# Patient Record
Sex: MALE | Employment: FULL TIME | URBAN - METROPOLITAN AREA
[De-identification: names, ages, dates, MRNs, and addresses within clinical notes are randomized per-mention and may not be internally consistent; named-entity substitution may affect disease eponyms.]

---

## 2012-05-01 LAB
ALBUMIN SERPL-MCNC: NORMAL G/DL
ALP SERPL-CCNC: 86 U/L
ALT SERPL-CCNC: 87 U/L
ANION GAP SERPL CALCULATED.3IONS-SCNC: NORMAL MMOL/L
AST SERPL-CCNC: 46 U/L
BILIRUB SERPL-MCNC: 0.8 MG/DL
BILIRUBIN DIRECT: NORMAL MG/DL
BUN SERPL-MCNC: NORMAL MG/DL
CALCIUM SERPL-MCNC: NORMAL MG/DL
CHLORIDE SERPLBLD-SCNC: 105 MMOL/L
CHOLEST SERPL-MCNC: 191 MG/DL
CO2 SERPL-SCNC: NORMAL MMOL/L
CREAT SERPL-MCNC: 1.1 MG/DL
GFR SERPL CREATININE-BSD FRML MDRD: NORMAL ML/MIN/1.73M2
GLUCOSE SERPL-MCNC: 93 MG/DL (ref 70–99)
GLUCOSE SERPL-MCNC: NORMAL MG/DL (ref 70–99)
HDLC SERPL-MCNC: 43 MG/DL
LDLC SERPL CALC-MCNC: 112 MG/DL
NONHDLC SERPL-MCNC: NORMAL MG/DL
POTASSIUM SERPL-SCNC: 5.2 MMOL/L
PROT SERPL-MCNC: NORMAL G/DL
SODIUM SERPL-SCNC: 139 MMOL/L
TRIGL SERPL-MCNC: 179 MG/DL

## 2017-01-09 ENCOUNTER — TRANSFERRED RECORDS (OUTPATIENT)
Dept: CARDIOLOGY | Facility: CLINIC | Age: 47
End: 2017-01-09

## 2017-05-17 ENCOUNTER — TELEPHONE (OUTPATIENT)
Dept: CARDIOLOGY | Facility: CLINIC | Age: 47
End: 2017-05-17

## 2017-05-17 NOTE — TELEPHONE ENCOUNTER
Attempted to call pt's wife re: any medical history, left message for wife to call back. LPenfield RN

## 2017-05-18 ENCOUNTER — DOCUMENTATION ONLY (OUTPATIENT)
Dept: CARDIOLOGY | Facility: CLINIC | Age: 47
End: 2017-05-18

## 2017-05-19 ENCOUNTER — OFFICE VISIT (OUTPATIENT)
Dept: CARDIOLOGY | Facility: CLINIC | Age: 47
End: 2017-05-19
Payer: COMMERCIAL

## 2017-05-19 VITALS
WEIGHT: 278 LBS | HEIGHT: 74 IN | SYSTOLIC BLOOD PRESSURE: 140 MMHG | HEART RATE: 75 BPM | BODY MASS INDEX: 35.68 KG/M2 | DIASTOLIC BLOOD PRESSURE: 80 MMHG

## 2017-05-19 DIAGNOSIS — R07.89 OTHER CHEST PAIN: ICD-10-CM

## 2017-05-19 DIAGNOSIS — Z82.49 FH: CORONARY ARTERY DISEASE: Primary | ICD-10-CM

## 2017-05-19 PROCEDURE — 93000 ELECTROCARDIOGRAM COMPLETE: CPT | Performed by: INTERNAL MEDICINE

## 2017-05-19 PROCEDURE — 99204 OFFICE O/P NEW MOD 45 MIN: CPT | Mod: 25 | Performed by: INTERNAL MEDICINE

## 2017-05-19 NOTE — LETTER
5/19/2017    [facility] [place]  [address]  [place] [address]    RE: Jt Kaba       Dear Colleague,    REFERRING PHYSICIAN:  [facility].      INDICATION FOR CARDIAC CONSULTATION:  Chest discomfort.  Strong family history of coronary artery disease.      It is my pleasure to see your patient, Jt Kaba, who is a very pleasant 46-year-old gentleman who has a very strong family history of coronary artery disease.  His grandmother and mother both had coronary artery disease with bypass surgery.  His brother, who is a mixed martial arts specialist, had a heart attack in his mid 40s.  He never smoked.  His diet was not particularly good, however.  The patient has been complaining of some chest discomfort.  He has 1 type of chest discomfort which is a feeling of a pressure in the chest.  He says it feels somewhat like when one is underwater trying to get ones breath.  It is not aggravated by exertion.  It is not relieved by rest.  He has a second type of discomfort which is described as a sharp pain going from the lower sternal straight through into the back.  He has noticed that more frequently.  He has not had his cholesterol checked in the past.  His blood pressure is borderline here at 140/80.  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  Now he weighs 278 pounds.      His 12-lead EKG was essentially normal.  He has an RSR prime pattern in V1 and V2, which is a normal variant.  So he has no EKG changes while he is having the discomfort today.  He has no ankle edema.  He has no orthopnea, no PND.     Outpatient Encounter Prescriptions as of 5/19/2017   Medication Sig Dispense Refill     TRAMADOL HCL PO Take 50 mg by mouth every 6 hours as needed for moderate to severe pain       Zolpidem Tartrate (AMBIEN PO) Take 10 mg by mouth as needed for sleep       ASPIRIN EC PO Take 325 mg by mouth  daily       No facility-administered encounter medications on file as of 5/19/2017.       IMPRESSION:   1.  Chest discomfort.  The chest discomfort sounds noncardiac in that it is nonexertional and does not radiate to the arms or to the jaw.   2.  Strong family history.  The family history of early atherosclerosis, especially in his brother who was a very fit man, is somewhat worrisome.  His brother eventually had a stent placed for the heart attack that occurred.  It seems to be that the coronary artery disease is on his mother's side.  By all accounts, his father is still in good condition.  His grandfather on his father's side did have a stroke.   3.  Borderline hypertension.      PLAN:   1.  Firstly, we will obtain an echocardiogram as the patient is having chest discomfort to determine if there are any structural abnormalities present.   2.  Secondly, we will obtain a stress echocardiogram to investigate his chest discomfort.   3.  Thirdly, as the patient has a very strong family history of early atherosclerosis, we will obtain a calcium score to see if any calcification is present.      It has been my pleasure to be involved in the care of this very nice patient.  I will see him back early.     Sincerely,    Raymon Mendoza MD    [facility]

## 2017-05-19 NOTE — PROGRESS NOTES
HPI and Plan:   See dictation    Orders Placed This Encounter   Procedures     CT Coronary Calcium Scan     Basic metabolic panel     Lipid Profile     Follow-Up with Cardiologist     EKG 12-lead complete w/read - Clinics (performed today)     Echocardiogram     Exercise Stress Echocardiogram       Orders Placed This Encounter   Medications     TRAMADOL HCL PO     Sig: Take 50 mg by mouth every 6 hours as needed for moderate to severe pain     Zolpidem Tartrate (AMBIEN PO)     Sig: Take 10 mg by mouth as needed for sleep     ASPIRIN EC PO     Sig: Take 325 mg by mouth daily       There are no discontinued medications.      Encounter Diagnoses   Name Primary?     FH: coronary artery disease Yes     Other chest pain        CURRENT MEDICATIONS:  Current Outpatient Prescriptions   Medication Sig Dispense Refill     TRAMADOL HCL PO Take 50 mg by mouth every 6 hours as needed for moderate to severe pain       Zolpidem Tartrate (AMBIEN PO) Take 10 mg by mouth as needed for sleep       ASPIRIN EC PO Take 325 mg by mouth daily         ALLERGIES   No Known Allergies    PAST MEDICAL HISTORY:  History reviewed. No pertinent past medical history.    PAST SURGICAL HISTORY:  History reviewed. No pertinent surgical history.    FAMILY HISTORY:  Family History   Problem Relation Age of Onset     Coronary Artery Disease Mother      CANCER Mother      throat or stomach     Coronary Artery Disease Brother 45     MI, stents       SOCIAL HISTORY:  Social History     Social History     Marital status:      Spouse name: N/A     Number of children: N/A     Years of education: N/A     Social History Main Topics     Smoking status: Never Smoker     Smokeless tobacco: None     Alcohol use Yes      Comment: 8-10 beers per week     Drug use: None     Sexual activity: Not Asked     Other Topics Concern     Parent/Sibling W/ Cabg, Mi Or Angioplasty Before 65f 55m? Yes     Social History Narrative     None       Review of Systems:  Skin:   "Negative       Eyes:  Negative      ENT:  Negative      Respiratory:  Positive for shortness of breath     Cardiovascular:    Positive for;chest pain;lightheadedness (past 3 weeks)    Gastroenterology: Negative      Genitourinary:  not assessed      Musculoskeletal:  Negative      Neurologic:  Negative      Psychiatric:  Negative      Heme/Lymph/Imm:  Negative      Endocrine:  Negative        Physical Exam:  Vitals: /80  Pulse 75  Ht 1.88 m (6' 2\")  Wt 126.1 kg (278 lb)  BMI 35.69 kg/m2    Constitutional:  cooperative, alert and oriented, well developed, well nourished, in no acute distress overweight      Skin:  warm and dry to the touch, no apparent skin lesions or masses noted        Head:  normocephalic, no masses or lesions        Eyes:  pupils equal and round, conjunctivae and lids unremarkable, sclera white, no xanthalasma, EOMS intact, no nystagmus        ENT:  no pallor or cyanosis, dentition good        Neck:  carotid pulses are full and equal bilaterally, JVP normal, no carotid bruit, no thyromegaly        Chest:  normal breath sounds, clear to auscultation, normal A-P diameter, normal symmetry, normal respiratory excursion, no use of accessory muscles          Cardiac: regular rhythm, normal S1/S2, no S3 or S4, apical impulse not displaced, no murmurs, gallops or rubs                  Abdomen:  abdomen soft, non-tender, BS normoactive, no mass, no HSM, no bruits        Vascular: pulses full and equal, no bruits auscultated                                        Extremities and Back:  no deformities, clubbing, cyanosis, erythema observed;no edema              Neurological:  affect appropriate, oriented to time, person and place              CC  No referring provider defined for this encounter.              "

## 2017-05-19 NOTE — PROGRESS NOTES
REFERRING PHYSICIAN:  [facility].      INDICATION FOR CARDIAC CONSULTATION:  Chest discomfort.  Strong family history of coronary artery disease.      HISTORY OF PRESENT ILLNESS:  It is my pleasure to see your patient, Jt Kaba, who is a very pleasant 46-year-old gentleman who has a very strong family history of coronary artery disease.  His grandmother and mother both had coronary artery disease with bypass surgery.  His brother, who is a mixed martial arts specialist, had a heart attack in his mid 40s.  He never smoked.  His diet was not particularly good, however.  The patient has been complaining of some chest discomfort.  He has 1 type of chest discomfort which is a feeling of a pressure in the chest.  He says it feels somewhat like when one is underwater trying to get ones breath.  It is not aggravated by exertion.  It is not relieved by rest.  He has a second type of discomfort which is described as a sharp pain going from the lower sternal straight through into the back.  He has noticed that more frequently.  He has not had his cholesterol checked in the past.  His blood pressure is borderline here at 140/80.  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  Now he weighs 278 pounds.      His 12-lead EKG was essentially normal.  He has an RSR prime pattern in V1 and V2, which is a normal variant.  So he has no EKG changes while he is having the discomfort today.  He has no ankle edema.  He has no orthopnea, no PND.      IMPRESSION:   1.  Chest discomfort.  The chest discomfort sounds noncardiac in that it is nonexertional and does not radiate to the arms or to the jaw.   2.  Strong family history.  The family history of early atherosclerosis, especially in his brother who was a very fit man, is somewhat worrisome.  His brother eventually had a stent placed for the heart attack that occurred.  It seems to be that  the coronary artery disease is on his mother's side.  By all accounts, his father is still in good condition.  His grandfather on his father's side did have a stroke.   3.  Borderline hypertension.      PLAN:   1.  Firstly, we will obtain an echocardiogram as the patient is having chest discomfort to determine if there are any structural abnormalities present.   2.  Secondly, we will obtain a stress echocardiogram to investigate his chest discomfort.   3.  Thirdly, as the patient has a very strong family history of early atherosclerosis, we will obtain a calcium score to see if any calcification is present.      It has been my pleasure to be involved in the care of this very nice patient.  I will see him back early.         ROBSON SANTORO MD, [facility]             D: 2017 09:14   T: 2017 18:05   MT: SCARLET      Name:     LEI JACKSON   MRN:      [id number]        Account:      [id number]   :      1970           Service Date: 2017      Document: [id number]

## 2017-05-19 NOTE — MR AVS SNAPSHOT
After Visit Summary   5/19/2017    Jt Kaba    MRN: [id number]           Patient Information     Date Of Birth          1970        Visit Information        Provider Department      5/19/2017 8:30 AM Raymon Mendoza MD [facility]        Today's Diagnoses     FH: coronary artery disease    -  1    Other chest pain           Follow-ups after your visit        Additional Services     Follow-Up with Cardiologist                 Your next 10 appointments already scheduled     May 31, 2017  9:45 AM CDT   Return Visit with Raymon Mendoza MD   [facility] ([facility])    [address] [place] [id number]   [phone]              Future tests that were ordered for you today     Open Future Orders        Priority Expected Expires Ordered    Follow-Up with Cardiologist Routine 5/26/2017 5/19/2018 5/19/2017    Basic metabolic panel Routine 5/19/2017 5/14/2018 5/19/2017    Lipid Profile Routine 5/19/2017 5/19/2018 5/19/2017    CT Coronary Calcium Scan Routine 5/20/2017 5/19/2018 5/19/2017    Echocardiogram Routine 5/26/2017 5/19/2018 5/19/2017    Exercise Stress Echocardiogram Routine 5/26/2017 5/19/2018 5/19/2017            Who to contact     If you have questions or need follow up information about today's clinic visit or your schedule please contact [facility] directly at [phone].  Normal or non-critical lab and imaging results will be communicated to you by MyChart, letter or phone within 4 business days after the clinic has received the results. If you do not hear from us within 7 days, please contact the clinic through MyChart or phone. If you have a critical or abnormal lab result, we will notify you by phone as soon as possible.  Submit refill requests through Meta or call your pharmacy and they will forward  "the refill request to us. Please allow 3 business days for your refill to be completed.          Additional Information About Your Visit        [employer] Information     [facility] lets you send messages to your doctor, view your test results, renew your prescriptions, schedule appointments and more. To sign up, go to [email or web address] . Click on \"Log in\" on the left side of the screen, which will take you to the Welcome page. Then click on \"Sign up Now\" on the right side of the page.     You will be asked to enter the access code listed below, as well as some personal information. Please follow the directions to create your username and password.     Your access code is: [email or web address]  Expires: 2017  9:15 AM     Your access code will  in 90 days. If you need help or a new code, please call your [facility] clinic or [phone].        Care EveryWhere ID     This is your Care EveryWhere ID. This could be used by other organizations to access your [facility] medical records  [id number]        Your Vitals Were     Pulse Height BMI (Body Mass Index)             75 1.88 m (6' 2\") 35.69 kg/m2          Blood Pressure from Last 3 Encounters:   17 140/80    Weight from Last 3 Encounters:   17 126.1 kg (278 lb)              We Performed the Following     EKG 12-lead complete w/read - Clinics (performed today)        Primary Care Provider Office Phone # Fax #    [facility] [phone]       [address] [place] [address] [address]  [facility] [address]        Thank you!     Thank you for choosing [facility]  for your care. Our goal is always to provide you with excellent care. Hearing back from our patients is one way we can continue to improve our services. Please take a few minutes to complete the written survey that you may receive in the mail after your visit with us. Thank you!             Your Updated Medication List - Protect others around " you: Learn how to safely use, store and throw away your medicines at www.disposemymeds.org.          This list is accurate as of: 5/19/17  9:15 AM.  Always use your most recent med list.                   Brand Name Dispense Instructions for use    AMBIEN PO      Take 10 mg by mouth as needed for sleep       ASPIRIN EC PO      Take 325 mg by mouth daily       TRAMADOL HCL PO      Take 50 mg by mouth every 6 hours as needed for moderate to severe pain

## 2017-06-02 ENCOUNTER — HOSPITAL ENCOUNTER (OUTPATIENT)
Dept: CARDIOLOGY | Facility: CLINIC | Age: 47
End: 2017-06-02
Attending: INTERNAL MEDICINE
Payer: COMMERCIAL

## 2017-06-02 ENCOUNTER — HOSPITAL ENCOUNTER (OUTPATIENT)
Dept: CARDIOLOGY | Facility: CLINIC | Age: 47
Discharge: HOME | End: 2017-06-02
Attending: INTERNAL MEDICINE | Admitting: INTERNAL MEDICINE
Payer: COMMERCIAL

## 2017-06-02 DIAGNOSIS — Z82.49 FH: CORONARY ARTERY DISEASE: ICD-10-CM

## 2017-06-02 DIAGNOSIS — R07.89 OTHER CHEST PAIN: ICD-10-CM

## 2017-06-02 LAB
ANION GAP SERPL CALCULATED.3IONS-SCNC: 11.3 MMOL/L (ref 6–17)
BUN SERPL-MCNC: 11 MG/DL (ref 7–30)
CALCIUM SERPL-MCNC: 8.2 MG/DL (ref 8.5–10.5)
CHLORIDE SERPL-SCNC: 104 MMOL/L (ref 98–107)
CHOLEST SERPL-MCNC: 159 MG/DL
CO2 SERPL-SCNC: 26 MMOL/L (ref 23–29)
CREAT SERPL-MCNC: 1.23 MG/DL (ref 0.7–1.3)
GFR SERPL CREATININE-BSD FRML MDRD: 63 ML/MIN/1.7M2
GLUCOSE SERPL-MCNC: 111 MG/DL (ref 70–105)
HDLC SERPL-MCNC: 37 MG/DL
LDLC SERPL CALC-MCNC: 91 MG/DL
NONHDLC SERPL-MCNC: 122 MG/DL
POTASSIUM SERPL-SCNC: 4.3 MMOL/L (ref 3.5–5.1)
SODIUM SERPL-SCNC: 137 MMOL/L (ref 136–145)
TRIGL SERPL-MCNC: 155 MG/DL

## 2017-06-02 PROCEDURE — 93350 STRESS TTE ONLY: CPT | Mod: 26 | Performed by: INTERNAL MEDICINE

## 2017-06-02 PROCEDURE — 93306 TTE W/DOPPLER COMPLETE: CPT

## 2017-06-02 PROCEDURE — 93016 CV STRESS TEST SUPVJ ONLY: CPT | Performed by: INTERNAL MEDICINE

## 2017-06-02 PROCEDURE — 40000264 ECHO STRESS TEST WITH LUMASON

## 2017-06-02 PROCEDURE — 93018 CV STRESS TEST I&R ONLY: CPT | Performed by: INTERNAL MEDICINE

## 2017-06-02 PROCEDURE — 93306 TTE W/DOPPLER COMPLETE: CPT | Mod: 26 | Performed by: INTERNAL MEDICINE

## 2017-06-02 PROCEDURE — 80061 LIPID PANEL: CPT | Performed by: INTERNAL MEDICINE

## 2017-06-02 PROCEDURE — 93325 DOPPLER ECHO COLOR FLOW MAPG: CPT | Mod: 26 | Performed by: INTERNAL MEDICINE

## 2017-06-02 PROCEDURE — 25500064 ZZH RX 255 OP 636: Performed by: INTERNAL MEDICINE

## 2017-06-02 PROCEDURE — 36415 COLL VENOUS BLD VENIPUNCTURE: CPT | Performed by: INTERNAL MEDICINE

## 2017-06-02 PROCEDURE — 80048 BASIC METABOLIC PNL TOTAL CA: CPT | Performed by: INTERNAL MEDICINE

## 2017-06-02 PROCEDURE — 93321 DOPPLER ECHO F-UP/LMTD STD: CPT | Mod: 26 | Performed by: INTERNAL MEDICINE

## 2017-06-02 RX ADMIN — SULFUR HEXAFLUORIDE 5 ML: KIT at 09:38

## 2017-06-13 ENCOUNTER — OFFICE VISIT (OUTPATIENT)
Dept: CARDIOLOGY | Facility: CLINIC | Age: 47
End: 2017-06-13
Payer: COMMERCIAL

## 2017-06-13 VITALS
HEIGHT: 74 IN | WEIGHT: 281 LBS | DIASTOLIC BLOOD PRESSURE: 91 MMHG | BODY MASS INDEX: 36.06 KG/M2 | HEART RATE: 81 BPM | SYSTOLIC BLOOD PRESSURE: 147 MMHG

## 2017-06-13 DIAGNOSIS — R07.89 OTHER CHEST PAIN: ICD-10-CM

## 2017-06-13 DIAGNOSIS — Z82.49 FH: CORONARY ARTERY DISEASE: ICD-10-CM

## 2017-06-13 PROCEDURE — 99214 OFFICE O/P EST MOD 30 MIN: CPT | Performed by: INTERNAL MEDICINE

## 2017-06-13 NOTE — PROGRESS NOTES
HPI and Plan:   See dictation    Orders Placed This Encounter   Procedures     Lipid Profile     ALT     Basic metabolic panel     Follow-Up with Cardiologist       No orders of the defined types were placed in this encounter.      There are no discontinued medications.      Encounter Diagnoses   Name Primary?     FH: coronary artery disease      Other chest pain        CURRENT MEDICATIONS:  Current Outpatient Prescriptions   Medication Sig Dispense Refill     TRAMADOL HCL PO Take 50 mg by mouth every 6 hours as needed for moderate to severe pain       Zolpidem Tartrate (AMBIEN PO) Take 10 mg by mouth as needed for sleep       ASPIRIN EC PO Take 325 mg by mouth daily         ALLERGIES   No Known Allergies    PAST MEDICAL HISTORY:  No past medical history on file.    PAST SURGICAL HISTORY:  No past surgical history on file.    FAMILY HISTORY:  Family History   Problem Relation Age of Onset     Coronary Artery Disease Mother      CANCER Mother      throat or stomach     Coronary Artery Disease Brother 45     MI, stents       SOCIAL HISTORY:  Social History     Social History     Marital status:      Spouse name: N/A     Number of children: N/A     Years of education: N/A     Social History Main Topics     Smoking status: Never Smoker     Smokeless tobacco: None     Alcohol use Yes      Comment: 8-10 beers per week     Drug use: None     Sexual activity: Not Asked     Other Topics Concern     Parent/Sibling W/ Cabg, Mi Or Angioplasty Before 65f 55m? Yes     Social History Narrative       Review of Systems:  Skin:  Negative       Eyes:  Negative      ENT:  Negative      Respiratory:  Negative       Cardiovascular:    Positive for;dizziness x2   Gastroenterology: Negative      Genitourinary:  not assessed      Musculoskeletal:  Negative      Neurologic:  Negative      Psychiatric:  Negative      Heme/Lymph/Imm:  Negative      Endocrine:  Negative        Physical Exam:  Vitals: BP (!) 147/91  Pulse 81  Ht 1.88 m  "(6' 2\")  Wt 127.5 kg (281 lb)  BMI 36.08 kg/m2    Constitutional:           Skin:           Head:           Eyes:           ENT:           Neck:           Chest:             Cardiac:                    Abdomen:           Vascular:                                          Extremities and Back:                 Neurological:                 CC  Raymon Mendoza MD   [facility]  [address]  [place] [address]              "

## 2017-06-13 NOTE — LETTER
2017             [facility]      RE: Jt Kaba    MRN: [id number]   : 1970      Dear Doctors:       It is my pleasure to see your patient Jt Kaba, who is a very pleasant, 46-year-old gentleman with a strong family history of coronary artery disease, which I delineated on my dictation from just under 4 weeks ago.  As you know, he has been complaining of episodes of atypical chest discomfort.  Because of the strong family history and the atypical chest discomfort, we performed a stress echocardiogram.  The stress echocardiogram was normal.  The EKG portion was normal, and echo portion was also normal, showing no evidence of ischemia.  However, he did have some discomfort in his chest when he was exerting himself.  However, the evidence is very strong that this is noncardiac.  His regular echocardiogram was also quite normal, except that there was evidence of mild concentric left ventricular hypertrophy.  His blood pressure prior to the echocardiogram was 150/90.  The last 2 blood pressure checks have also been in the borderline of normal or mildly abnormal range.  On  of this year, his blood pressure was 140/80.  Today his blood pressure is 147/91.  We did perform a lipid profile on .  His LDL was normal at 91.  His HDL was mildly reduced at 37, and his triglycerides were mildly raised at 155.  The basic metabolic profile was relatively normal except that his glucose was raised at 111, which may account for his triglycerides being mildly raised.  His kidney function was normal, and his electrolytes were entirely normal.      IMPRESSION:   1.  Normal stress echocardiogram at a good workload with no evidence of ischemia on either the EKG portion or the echo portion, but he did have some discomfort in his chest with exertion.   2.  Mild HDL deficiency and mild hypertriglyceridemia, which may in part be due to the fact the patient is overweight and somewhat sedentary, but  his fasting glucose is also raised at 111.  He probably has metabolic syndrome.   3.  Essential hypertension.  It is likely that the patient does have hypertension given the fact that he has LVH, and he has had 3 blood pressure measurements, which have been borderline to mildly raised.      PLAN:  Initially, I did suggest an angiotensin receptor blocker for blood pressure lowering, but the patient would like to try lifestyle changes, including exercise and weight reduction, which will hit many birds with one stone.  This will improve his blood pressure.  It will improve his hypertriglyceridemia and HDL deficiency.  It will also improve his glucose levels, so in the end we decided that he would adopt lifestyle changes and return to see me in 4 months' time.  If at that stage he still has a raised blood pressure, I will add in an angiotensin receptor blocker.  Finally, I did tell him if he notices that he is continuing to get predominantly exertional chest discomfort, he is to contact us immediately because I did explain to him that the stress tests are very accurate, but there is a false negative rate, too, as well, so he understands that.        It has been a pleasure to be involved in the care of this very nice patient.      Sincerely,      Raymon Pickens MD, FACC

## 2017-06-13 NOTE — MR AVS SNAPSHOT
"              After Visit Summary   6/13/2017    Jt Kaba    MRN: [id number]           Patient Information     Date Of Birth          1970        Visit Information        Provider Department      6/13/2017 2:15 PM Raymon Mendoza MD [facility]        Today's Diagnoses     FH: coronary artery disease        Other chest pain           Follow-ups after your visit        Additional Services     Follow-Up with Cardiologist                 Future tests that were ordered for you today     Open Future Orders        Priority Expected Expires Ordered    Lipid Profile Routine 10/11/2017 6/13/2018 6/13/2017    ALT Routine 10/11/2017 6/13/2018 6/13/2017    Basic metabolic panel Routine 10/11/2017 6/13/2018 6/13/2017    Follow-Up with Cardiologist Routine 10/11/2017 6/13/2018 6/13/2017            Who to contact     If you have questions or need follow up information about today's clinic visit or your schedule please contact [facility] directly at [phone].  Normal or non-critical lab and imaging results will be communicated to you by [email or web address], letter or phone within 4 business days after the clinic has received the results. If you do not hear from us within 7 days, please contact the clinic through [email or web address] or phone. If you have a critical or abnormal lab result, we will notify you by phone as soon as possible.  Submit refill requests through [employer] or call your pharmacy and they will forward the refill request to us. Please allow 3 business days for your refill to be completed.          Additional Information About Your Visit        [email or web address] Information     [employer] lets you send messages to your doctor, view your test results, renew your prescriptions, schedule appointments and more. To sign up, go to [email or web address] . Click on \"Log in\" on the left side of the screen, which will take you to the Welcome page. " "Then click on \"Sign up Now\" on the right side of the page.     You will be asked to enter the access code listed below, as well as some personal information. Please follow the directions to create your username and password.     Your access code is: [email or web address]  Expires: 2017  9:15 AM     Your access code will  in 90 days. If you need help or a new code, please call your [facility] clinic or [phone].        Care EveryWhere ID     This is your Care EveryWhere ID. This could be used by other organizations to access your [facility] medical records  [id number]        Your Vitals Were     Pulse Height BMI (Body Mass Index)             81 1.88 m (6' 2\") 36.08 kg/m2          Blood Pressure from Last 3 Encounters:   17 (!) 147/91   17 140/80    Weight from Last 3 Encounters:   17 127.5 kg (281 lb)   17 126.1 kg (278 lb)              We Performed the Following     Follow-Up with Cardiologist        Primary Care Provider Office Phone # Fax #    [facility] [phone]       [address] [place] [address]. [address]  [facility] [address]        Thank you!     Thank you for choosing [facility]  for your care. Our goal is always to provide you with excellent care. Hearing back from our patients is one way we can continue to improve our services. Please take a few minutes to complete the written survey that you may receive in the mail after your visit with us. Thank you!             Your Updated Medication List - Protect others around you: Learn how to safely use, store and throw away your medicines at www.disposemymeds.org.          This list is accurate as of: 17  2:42 PM.  Always use your most recent med list.                   Brand Name Dispense Instructions for use    AMBIEN PO      Take 10 mg by mouth as needed for sleep       ASPIRIN EC PO      Take 325 mg by mouth daily       TRAMADOL HCL PO      Take 50 mg by mouth every 6 " hours as needed for moderate to severe pain

## 2017-06-14 NOTE — PROGRESS NOTES
2017             [facility]      RE: Jt Kaba    MRN: [id number]   : 1970      Dear Doctors:       It is my pleasure to see your patient Jt Kaba, who is a very pleasant, 46-year-old gentleman with a strong family history of coronary artery disease, which I delineated on my dictation from just under 4 weeks ago.  As you know, he has been complaining of episodes of atypical chest discomfort.  Because of the strong family history and the atypical chest discomfort, we performed a stress echocardiogram.  The stress echocardiogram was normal.  The EKG portion was normal, and echo portion was also normal, showing no evidence of ischemia.  However, he did have some discomfort in his chest when he was exerting himself.  However, the evidence is very strong that this is noncardiac.  His regular echocardiogram was also quite normal, except that there was evidence of mild concentric left ventricular hypertrophy.  His blood pressure prior to the echocardiogram was 150/90.  The last 2 blood pressure checks have also been in the borderline of normal or mildly abnormal range.  On  of this year, his blood pressure was 140/80.  Today his blood pressure is 147/91.  We did perform a lipid profile on .  His LDL was normal at 91.  His HDL was mildly reduced at 37, and his triglycerides were mildly raised at 155.  The basic metabolic profile was relatively normal except that his glucose was raised at 111, which may account for his triglycerides being mildly raised.  His kidney function was normal, and his electrolytes were entirely normal.      IMPRESSION:   1.  Normal stress echocardiogram at a good workload with no evidence of ischemia on either the EKG portion or the echo portion, but he did have some discomfort in his chest with exertion.   2.  Mild HDL deficiency and mild hypertriglyceridemia, which may in part be due to the fact the patient is overweight and somewhat sedentary, but  his fasting glucose is also raised at 111.  He probably has metabolic syndrome.   3.  Essential hypertension.  It is likely that the patient does have hypertension given the fact that he has LVH, and he has had 3 blood pressure measurements, which have been borderline to mildly raised.      PLAN:  Initially, I did suggest an angiotensin receptor blocker for blood pressure lowering, but the patient would like to try lifestyle changes, including exercise and weight reduction, which will hit many birds with one stone.  This will improve his blood pressure.  It will improve his hypertriglyceridemia and HDL deficiency.  It will also improve his glucose levels, so in the end we decided that he would adopt lifestyle changes and return to see me in 4 months' time.  If at that stage he still has a raised blood pressure, I will add in an angiotensin receptor blocker.  Finally, I did tell him if he notices that he is continuing to get predominantly exertional chest discomfort, he is to contact us immediately because I did explain to him that the stress tests are very accurate, but there is a false negative rate, too, as well, so he understands that.        It has been a pleasure to be involved in the care of this very nice patient.      Sincerely,      Raymon Pickens MD, FACC         RAYMON PICKENS MD, FACC             D: 2017 14:41   T: 2017 11:29   MT: denise      Name:     LEI JACKSON   MRN:      [id number]        Account:      [id number]   :      1970           Service Date: 2017      Document: [id number]

## 2024-10-21 ENCOUNTER — TELEPHONE (OUTPATIENT)
Dept: PHYSICAL MEDICINE AND REHAB | Facility: CLINIC | Age: 54
End: 2024-10-21

## 2024-10-21 DIAGNOSIS — G54.5 PARSONAGE-TURNER SYNDROME: Primary | ICD-10-CM

## 2024-10-21 DIAGNOSIS — M25.512 CHRONIC LEFT SHOULDER PAIN: ICD-10-CM

## 2024-10-21 DIAGNOSIS — M79.2 NEUROPATHIC PAIN: ICD-10-CM

## 2024-10-21 DIAGNOSIS — G89.29 CHRONIC LEFT SHOULDER PAIN: ICD-10-CM

## 2024-10-21 DIAGNOSIS — M54.10 BRACHIAL NEURITIS OF LEFT UPPER EXTREMITY: ICD-10-CM

## 2024-10-21 NOTE — TELEPHONE ENCOUNTER
Called patient to schedule procedure with Dr. Marcos    Date of Procedure: heather 11/22/24    Arrival time given: Yes: Arrival Time 11am       Procedure Location: [facility] [place]     Verified Location with Patient:  Yes  Address provided to the patient     Pre-op H&P Required:  No: Local anesthesia        Post-Op/Follow Up Appt:  Not Indicated in Request      Informed patient they will need a  to drive them home:  Yes    Patients : Spouse     Patient is aware that pre-op RN from the procedure center will call 2-3 days prior to scheduled procedure to confirm arrival time and review any instructions:  Yes       Additional Comments: N/A        Martha Vásquez MA on 10/21/2024 at 1:07 PM      P: [phone]*

## 2024-11-22 ENCOUNTER — ANCILLARY PROCEDURE (OUTPATIENT)
Dept: ULTRASOUND IMAGING | Facility: CLINIC | Age: 54
End: 2024-11-22
Attending: PHYSICAL MEDICINE & REHABILITATION
Payer: COMMERCIAL

## 2024-11-22 ENCOUNTER — HOSPITAL ENCOUNTER (OUTPATIENT)
Facility: AMBULATORY SURGERY CENTER | Age: 54
Discharge: HOME | End: 2024-11-22
Attending: PHYSICAL MEDICINE & REHABILITATION | Admitting: PHYSICAL MEDICINE & REHABILITATION
Payer: COMMERCIAL

## 2024-11-22 VITALS
SYSTOLIC BLOOD PRESSURE: 162 MMHG | DIASTOLIC BLOOD PRESSURE: 85 MMHG | OXYGEN SATURATION: 98 % | HEART RATE: 85 BPM | RESPIRATION RATE: 18 BRPM | TEMPERATURE: 97.4 F

## 2024-11-22 DIAGNOSIS — R52 PAIN: ICD-10-CM

## 2024-11-22 PROCEDURE — G8907 PT DOC NO EVENTS ON DISCHARG: HCPCS

## 2024-11-22 PROCEDURE — G8918 PT W/O PREOP ORDER IV AB PRO: HCPCS

## 2024-11-22 PROCEDURE — 64418 NJX AA&/STRD SPRSCAP NRV: CPT | Mod: LT

## 2024-11-22 RX ORDER — BUPIVACAINE HYDROCHLORIDE 5 MG/ML
INJECTION, SOLUTION EPIDURAL; INTRACAUDAL PRN
Status: DISCONTINUED | OUTPATIENT
Start: 2024-11-22 | End: 2024-11-22 | Stop reason: HOSPADM

## 2024-11-22 RX ORDER — METFORMIN HYDROCHLORIDE 750 MG/1
750 TABLET, EXTENDED RELEASE ORAL
COMMUNITY

## 2024-11-22 RX ORDER — LIDOCAINE HYDROCHLORIDE 10 MG/ML
INJECTION, SOLUTION EPIDURAL; INFILTRATION; INTRACAUDAL; PERINEURAL PRN
Status: DISCONTINUED | OUTPATIENT
Start: 2024-11-22 | End: 2024-11-22 | Stop reason: HOSPADM

## 2024-11-22 RX ORDER — TRIAMCINOLONE ACETONIDE 40 MG/ML
INJECTION, SUSPENSION INTRA-ARTICULAR; INTRAMUSCULAR PRN
Status: DISCONTINUED | OUTPATIENT
Start: 2024-11-22 | End: 2024-11-22 | Stop reason: HOSPADM

## 2024-11-22 NOTE — DISCHARGE INSTRUCTIONS
PAIN INJECTION HOME CARE INSTRUCTIONS  Activity  -Rest today  -Do not work today  -Resume normal activity tomorrow  -DO NOT shower for 24 hours  -DO NOT remove bandaid for 24 hours    Pain  -You may experience soreness at the injection site for one or two days  -You may use an ice pack for 20 minutes every 2 hours for the first 24 hours  -You may use a heating pad after the first 24 hours  -You may use Tylenol (acetaminophen) every 4 hours or other pain medicines as directed by your physician    You may experience numbness radiating into your legs or arms (depending on the procedure location). This numbness may last several hours. Until sensation returns to normal; please use caution in walking, climbing stairs, and stepping out of your vehicle, etc.    DID YOU RECEIVE SEDATION TODAY?  No    Safety  Sedation medicine, if given, may remain active for many hours. It is important for the next 24 hours that you do not:  -Drive a car  -Operate machines or power tools  -Consume alcohol, including beer  -Sign any important papers or legal documents    DID YOU RECEIVE STEROIDS TODAY?      Common side effects of steroids:  Not everyone will experience corticosteroid side effects. If side effects are experienced, they will gradually subside in the 7-10 day period following an injection. Most common side effects include:  -Flushed face and/or chest  -Feeling of warmth, particularly in the face but could be an overall feeling of warmth  -Increased blood sugar in diabetic patients  -Menstrual irregularities my occur. If taking hormone-based birth control an alternate method of birth control is recommended  -Sleep disturbances and/or mood swings are possible  -Leg cramps    Please contact us if you have:  -Severe pain  -Fever more than 101.5 degrees Fahrenheit  -Signs of infection at the injection site (redness, swelling, or drainage)    FOR PAIN CENTER PATIENTS:  If you have questions, please contact the Pain Clinic at  [phone] Option #1 between the hours of 7:00 am and 3:00 pm Monday through Friday. After office hours you can contact the on call provider by dialing [phone]. If you need immediate attention, we recommend that you go to a hospital emergency room or dial [phone].      FOR PM&R PATIENTS:  For patients seen by the PM and R service, please call [phone]. (Monday through Friday 8a-5p.  After business hours and weekends call [phone] and ask for the PM and R doctor on call). If you need to fax a pain diary to PM&R the fax number is [phone]. If you are unable to fax, uploading to [employer] is encouraged, then send to provider. If you have procedure scheduling questions please call [phone] Option #2.

## 2024-11-22 NOTE — OP NOTE
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    INDICATIONS FOR THE PROCEDURE:  Jt Kaba is a 54 year old male who presents with chronic left shoulder pain with neuropathic features consistent with brachial neuritis/Parsonage-Francisco syndrome affecting the left upper extremity this has been confirmed with both MRI and electrodiagnostic workup.     PROCEDURE AND FINDINGS:  He was greeted in the preprocedure holding area. The risk, benefits and alternatives to the procedure were again reviewed with him and informed consent was obtained and the patient agreed to proceed. A time-out was performed. Following review alternatives, benefits and risks, the procedure was carried out under sterile prep with sterile gel. The use of direct sonographic guidance was used to ensure accurate placement of the needle (rather than non-guided injection) and required to minimize the risk of bleeding or injury to nearby neurovascular structures. Images were recorded and stored.     A 5-1MHz ultrasound transducer was used to visualize the relevant structures and determine the optimal needle path for the procedure.  1mL 1% lidocaine was infiltrated at the needle insertion site subcutaneously. Then, a 22 gauge 3.5 inch needle was advanced from lateral to medial utilizing an in-plane approach, under continuous ultrasound guidance to the perineural region of the suprascapular nerve at the spinoglenoid notch. After negative aspiration, slow injection of the treatment solution 4mL total  consisting of 1mL Kenalog (40mg/mL) and 3mL of 1% Lidocaine was instilled into affected area. The tip of the needle was visualized throughout the procedure. The remainder of the single-use vials were discarded.      He tolerated the procedure well, was discharged home in stable condition.     Before the procedure, he reported a pain score of 4/10.   After the procedure, he reported a pain score of 0/10.      Follow-up will be with referring provider (PCP - Dr. Kairna Calabrese)    COMPLICATIONS: None    COMMENTS: None

## (undated) DEVICE — LINEN TOWEL PACK X5 5464

## (undated) DEVICE — PREP CHLORAPREP W/ORANGE TINT 10.5ML 930715

## (undated) DEVICE — TRAY PAIN INJECTION 97A 640

## (undated) DEVICE — NDL CANNULA SOLOPLEX STIM 21GX100MM 001187-77

## (undated) DEVICE — GLOVE BIOGEL PI MICRO SZ 7.0 48570